# Patient Record
Sex: FEMALE | Race: BLACK OR AFRICAN AMERICAN | NOT HISPANIC OR LATINO | Employment: STUDENT | URBAN - METROPOLITAN AREA
[De-identification: names, ages, dates, MRNs, and addresses within clinical notes are randomized per-mention and may not be internally consistent; named-entity substitution may affect disease eponyms.]

---
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
## 2025-07-03 ENCOUNTER — HOSPITAL ENCOUNTER (EMERGENCY)
Facility: CLINIC | Age: 20
Discharge: HOME | End: 2025-07-03
Attending: STUDENT IN AN ORGANIZED HEALTH CARE EDUCATION/TRAINING PROGRAM
Payer: COMMERCIAL

## 2025-07-03 ENCOUNTER — APPOINTMENT (OUTPATIENT)
Dept: CT IMAGING | Facility: CLINIC | Age: 20
End: 2025-07-03
Attending: STUDENT IN AN ORGANIZED HEALTH CARE EDUCATION/TRAINING PROGRAM
Payer: COMMERCIAL

## 2025-07-03 VITALS
SYSTOLIC BLOOD PRESSURE: 171 MMHG | HEART RATE: 90 BPM | HEIGHT: 67 IN | WEIGHT: 293 LBS | BODY MASS INDEX: 45.99 KG/M2 | OXYGEN SATURATION: 100 % | DIASTOLIC BLOOD PRESSURE: 99 MMHG | RESPIRATION RATE: 17 BRPM | TEMPERATURE: 97.4 F

## 2025-07-03 DIAGNOSIS — K44.9 HIATAL HERNIA: ICD-10-CM

## 2025-07-03 DIAGNOSIS — R11.2 NAUSEA AND VOMITING, UNSPECIFIED VOMITING TYPE: Primary | ICD-10-CM

## 2025-07-03 DIAGNOSIS — R10.13 EPIGASTRIC PAIN: ICD-10-CM

## 2025-07-03 PROBLEM — Z86.59 HISTORY OF EATING DISORDER: Status: ACTIVE | Noted: 2023-05-25

## 2025-07-03 PROBLEM — F31.32 BIPOLAR AFFECTIVE DISORDER, CURRENTLY DEPRESSED, MODERATE (H): Status: ACTIVE | Noted: 2023-02-27

## 2025-07-03 PROBLEM — K21.9 GASTROESOPHAGEAL REFLUX DISEASE: Status: ACTIVE | Noted: 2023-05-25

## 2025-07-03 PROBLEM — F15.93: Status: ACTIVE | Noted: 2023-10-05

## 2025-07-03 PROBLEM — F19.20 POLYSUBSTANCE (EXCLUDING OPIOIDS) DEPENDENCE (H): Status: ACTIVE | Noted: 2023-10-05

## 2025-07-03 PROBLEM — E66.9 OBESITY: Status: ACTIVE | Noted: 2017-08-11

## 2025-07-03 PROBLEM — N92.0 MENORRHAGIA WITH REGULAR CYCLE: Status: ACTIVE | Noted: 2023-04-20

## 2025-07-03 PROBLEM — I10 HYPERTENSION: Status: ACTIVE | Noted: 2023-03-01

## 2025-07-03 PROBLEM — N92.6 IRREGULAR PERIODS/MENSTRUAL CYCLES: Status: ACTIVE | Noted: 2023-05-25

## 2025-07-03 PROBLEM — F11.13: Status: ACTIVE | Noted: 2023-02-27

## 2025-07-03 PROBLEM — F11.23 OPIOID DEPENDENCE WITH WITHDRAWAL (H): Status: ACTIVE | Noted: 2023-10-05

## 2025-07-03 PROBLEM — F15.10 METHAMPHETAMINE USE (H): Status: ACTIVE | Noted: 2023-10-03

## 2025-07-03 LAB
ALBUMIN SERPL BCG-MCNC: 5 G/DL (ref 3.5–5.2)
ALP SERPL-CCNC: 121 U/L (ref 40–150)
ALT SERPL W P-5'-P-CCNC: 28 U/L (ref 0–50)
ANION GAP SERPL CALCULATED.3IONS-SCNC: 14 MMOL/L (ref 7–15)
AST SERPL W P-5'-P-CCNC: 45 U/L (ref 0–45)
ATRIAL RATE - MUSE: 87 BPM
BASOPHILS # BLD AUTO: 0 10E3/UL (ref 0–0.2)
BASOPHILS NFR BLD AUTO: 0 %
BILIRUB SERPL-MCNC: 0.5 MG/DL
BUN SERPL-MCNC: 13.1 MG/DL (ref 6–20)
CALCIUM SERPL-MCNC: 10 MG/DL (ref 8.8–10.4)
CHLORIDE SERPL-SCNC: 100 MMOL/L (ref 98–107)
CREAT SERPL-MCNC: 0.88 MG/DL (ref 0.51–0.95)
DIASTOLIC BLOOD PRESSURE - MUSE: NORMAL MMHG
EGFRCR SERPLBLD CKD-EPI 2021: >90 ML/MIN/1.73M2
EOSINOPHIL # BLD AUTO: 0 10E3/UL (ref 0–0.7)
EOSINOPHIL NFR BLD AUTO: 0 %
ERYTHROCYTE [DISTWIDTH] IN BLOOD BY AUTOMATED COUNT: 13.7 % (ref 10–15)
GLUCOSE SERPL-MCNC: 114 MG/DL (ref 70–99)
HCG SERPL QL: NEGATIVE
HCO3 SERPL-SCNC: 22 MMOL/L (ref 22–29)
HCT VFR BLD AUTO: 39.9 % (ref 35–47)
HGB BLD-MCNC: 13.2 G/DL (ref 11.7–15.7)
IMM GRANULOCYTES # BLD: 0.1 10E3/UL
IMM GRANULOCYTES NFR BLD: 1 %
INTERPRETATION ECG - MUSE: NORMAL
LIPASE SERPL-CCNC: 24 U/L (ref 13–60)
LYMPHOCYTES # BLD AUTO: 1 10E3/UL (ref 0.8–5.3)
LYMPHOCYTES NFR BLD AUTO: 8 %
MCH RBC QN AUTO: 28.1 PG (ref 26.5–33)
MCHC RBC AUTO-ENTMCNC: 33.1 G/DL (ref 31.5–36.5)
MCV RBC AUTO: 85 FL (ref 78–100)
MONOCYTES # BLD AUTO: 0.4 10E3/UL (ref 0–1.3)
MONOCYTES NFR BLD AUTO: 3 %
NEUTROPHILS # BLD AUTO: 12.2 10E3/UL (ref 1.6–8.3)
NEUTROPHILS NFR BLD AUTO: 89 %
NRBC # BLD AUTO: 0 10E3/UL
NRBC BLD AUTO-RTO: 0 /100
P AXIS - MUSE: 27 DEGREES
PLATELET # BLD AUTO: 355 10E3/UL (ref 150–450)
POTASSIUM SERPL-SCNC: 4.4 MMOL/L (ref 3.4–5.3)
PR INTERVAL - MUSE: 162 MS
PROT SERPL-MCNC: 9.3 G/DL (ref 6.4–8.3)
QRS DURATION - MUSE: 98 MS
QT - MUSE: 386 MS
QTC - MUSE: 464 MS
R AXIS - MUSE: 21 DEGREES
RBC # BLD AUTO: 4.69 10E6/UL (ref 3.8–5.2)
SODIUM SERPL-SCNC: 136 MMOL/L (ref 135–145)
SYSTOLIC BLOOD PRESSURE - MUSE: NORMAL MMHG
T AXIS - MUSE: 11 DEGREES
TROPONIN T SERPL HS-MCNC: 7 NG/L
VENTRICULAR RATE- MUSE: 87 BPM
WBC # BLD AUTO: 13.8 10E3/UL (ref 4–11)

## 2025-07-03 PROCEDURE — 250N000011 HC RX IP 250 OP 636: Performed by: STUDENT IN AN ORGANIZED HEALTH CARE EDUCATION/TRAINING PROGRAM

## 2025-07-03 PROCEDURE — 80053 COMPREHEN METABOLIC PANEL: CPT | Performed by: STUDENT IN AN ORGANIZED HEALTH CARE EDUCATION/TRAINING PROGRAM

## 2025-07-03 PROCEDURE — 250N000009 HC RX 250: Performed by: STUDENT IN AN ORGANIZED HEALTH CARE EDUCATION/TRAINING PROGRAM

## 2025-07-03 PROCEDURE — 84484 ASSAY OF TROPONIN QUANT: CPT | Performed by: STUDENT IN AN ORGANIZED HEALTH CARE EDUCATION/TRAINING PROGRAM

## 2025-07-03 PROCEDURE — 96375 TX/PRO/DX INJ NEW DRUG ADDON: CPT

## 2025-07-03 PROCEDURE — 99285 EMERGENCY DEPT VISIT HI MDM: CPT | Mod: 25

## 2025-07-03 PROCEDURE — 36415 COLL VENOUS BLD VENIPUNCTURE: CPT | Performed by: STUDENT IN AN ORGANIZED HEALTH CARE EDUCATION/TRAINING PROGRAM

## 2025-07-03 PROCEDURE — 93005 ELECTROCARDIOGRAM TRACING: CPT

## 2025-07-03 PROCEDURE — 74177 CT ABD & PELVIS W/CONTRAST: CPT

## 2025-07-03 PROCEDURE — 84703 CHORIONIC GONADOTROPIN ASSAY: CPT | Performed by: STUDENT IN AN ORGANIZED HEALTH CARE EDUCATION/TRAINING PROGRAM

## 2025-07-03 PROCEDURE — 85025 COMPLETE CBC W/AUTO DIFF WBC: CPT | Performed by: STUDENT IN AN ORGANIZED HEALTH CARE EDUCATION/TRAINING PROGRAM

## 2025-07-03 PROCEDURE — 83690 ASSAY OF LIPASE: CPT | Performed by: STUDENT IN AN ORGANIZED HEALTH CARE EDUCATION/TRAINING PROGRAM

## 2025-07-03 PROCEDURE — 96374 THER/PROPH/DIAG INJ IV PUSH: CPT | Mod: 59

## 2025-07-03 PROCEDURE — 96376 TX/PRO/DX INJ SAME DRUG ADON: CPT

## 2025-07-03 PROCEDURE — 258N000003 HC RX IP 258 OP 636: Performed by: STUDENT IN AN ORGANIZED HEALTH CARE EDUCATION/TRAINING PROGRAM

## 2025-07-03 PROCEDURE — 96361 HYDRATE IV INFUSION ADD-ON: CPT

## 2025-07-03 RX ORDER — ONDANSETRON 4 MG/1
4 TABLET, ORALLY DISINTEGRATING ORAL EVERY 8 HOURS PRN
Qty: 10 TABLET | Refills: 0 | Status: SHIPPED | OUTPATIENT
Start: 2025-07-03 | End: 2025-07-03

## 2025-07-03 RX ORDER — DROPERIDOL 2.5 MG/ML
1.25 INJECTION, SOLUTION INTRAMUSCULAR; INTRAVENOUS ONCE
Status: COMPLETED | OUTPATIENT
Start: 2025-07-03 | End: 2025-07-03

## 2025-07-03 RX ORDER — ONDANSETRON 4 MG/1
4 TABLET, ORALLY DISINTEGRATING ORAL EVERY 8 HOURS PRN
Qty: 10 TABLET | Refills: 0 | Status: SHIPPED | OUTPATIENT
Start: 2025-07-03 | End: 2025-07-06

## 2025-07-03 RX ORDER — IOPAMIDOL 755 MG/ML
120 INJECTION, SOLUTION INTRAVASCULAR ONCE
Status: COMPLETED | OUTPATIENT
Start: 2025-07-03 | End: 2025-07-03

## 2025-07-03 RX ORDER — ONDANSETRON 2 MG/ML
4 INJECTION INTRAMUSCULAR; INTRAVENOUS EVERY 30 MIN PRN
Status: DISPENSED | OUTPATIENT
Start: 2025-07-03

## 2025-07-03 RX ADMIN — IOPAMIDOL 100 ML: 755 INJECTION, SOLUTION INTRAVENOUS at 22:19

## 2025-07-03 RX ADMIN — PROCHLORPERAZINE EDISYLATE 10 MG: 5 INJECTION INTRAMUSCULAR; INTRAVENOUS at 19:23

## 2025-07-03 RX ADMIN — SODIUM CHLORIDE, SODIUM LACTATE, POTASSIUM CHLORIDE, AND CALCIUM CHLORIDE 1000 ML: .6; .31; .03; .02 INJECTION, SOLUTION INTRAVENOUS at 21:09

## 2025-07-03 RX ADMIN — ONDANSETRON 4 MG: 2 INJECTION, SOLUTION INTRAMUSCULAR; INTRAVENOUS at 21:08

## 2025-07-03 RX ADMIN — ONDANSETRON 4 MG: 2 INJECTION, SOLUTION INTRAMUSCULAR; INTRAVENOUS at 18:52

## 2025-07-03 RX ADMIN — SODIUM CHLORIDE 65 ML: 9 INJECTION, SOLUTION INTRAVENOUS at 22:20

## 2025-07-03 RX ADMIN — DROPERIDOL 1.25 MG: 2.5 INJECTION, SOLUTION INTRAMUSCULAR; INTRAVENOUS at 22:10

## 2025-07-03 RX ADMIN — SODIUM CHLORIDE, SODIUM LACTATE, POTASSIUM CHLORIDE, AND CALCIUM CHLORIDE 1000 ML: .6; .31; .03; .02 INJECTION, SOLUTION INTRAVENOUS at 18:49

## 2025-07-03 ASSESSMENT — ACTIVITIES OF DAILY LIVING (ADL)
ADLS_ACUITY_SCORE: 42

## 2025-07-03 ASSESSMENT — COLUMBIA-SUICIDE SEVERITY RATING SCALE - C-SSRS
6. HAVE YOU EVER DONE ANYTHING, STARTED TO DO ANYTHING, OR PREPARED TO DO ANYTHING TO END YOUR LIFE?: NO
2. HAVE YOU ACTUALLY HAD ANY THOUGHTS OF KILLING YOURSELF IN THE PAST MONTH?: NO
1. IN THE PAST MONTH, HAVE YOU WISHED YOU WERE DEAD OR WISHED YOU COULD GO TO SLEEP AND NOT WAKE UP?: NO

## 2025-07-03 NOTE — ED PROVIDER NOTES
Emergency Department Note      History of Present Illness     Chief Complaint   Nausea, Vomiting, & Diarrhea      HPI   Raquel Parker is a very pleasant 20 year old female with a history of hypertension presenting with nausea, vomiting and diarrhea. Patient reports that she began vomiting at 0700 this morning shortly after she woke up. She hasn't been able to keep food down all day, and believes she has had over twenty episodes of emesis. This has happened a few times in the past, and she has needed IV fluids to rehydrate. She is also complaining of epigastric abdominal pain both while vomiting and while not. She had a single formed bowel movement today, and does not believe she is pregnant. She is sexually active but uses a morning after pill. She experienced shortness of breath this morning while laying in bed, and has experienced this intermittently throughout the day. She had four drinks of tequila last night. She has not used cannabis in 90 days. She endorses some chills while vomiting. She denies diarrhea, hematochezia, melena, difficulty urinating, fever, dysuria, history, or chest pain.    Independent Historian   None    Review of External Notes   I personally reviewed notes from the patient's emergency department visit  dated 5/6/25. This provided me with information regarding patient's baseline medical problems.     Past Medical History     Medical History and Problem List   Cannabis abuse  Fentanyl  Depression  Anxiety  Fentanyl use disorder  Bipolar affective disordedr  Methamphetamine use  Hypertension  Opioid dependence with withdrawal   Obesity     Medications   Buspirone  Cozaar  Minipress  Suboxone  Zoloft  Seroquel     Surgical History   Internal fixation tibia child     Physical Exam     Patient Vitals for the past 24 hrs:   BP Temp Temp src Pulse Resp SpO2 Height Weight   07/03/25 2303 -- -- -- 90 -- 100 % -- --   07/03/25 2302 -- -- -- 87 -- 100 % -- --   07/03/25 2301 -- -- -- 88 -- 100 %  "-- --   07/03/25 2300 -- -- -- 103 -- 100 % -- --   07/03/25 2259 -- -- -- 87 -- 100 % -- --   07/03/25 2258 -- -- -- 96 -- 100 % -- --   07/03/25 2257 -- -- -- 84 -- 100 % -- --   07/03/25 2256 -- -- -- 87 -- 100 % -- --   07/03/25 2213 -- -- -- 116 -- 100 % -- --   07/03/25 2212 -- -- -- 105 -- 100 % -- --   07/03/25 2211 -- -- -- 92 -- 100 % -- --   07/03/25 2210 (!) 171/99 -- -- 92 -- 100 % -- --   07/03/25 2029 -- -- -- -- -- 100 % -- --   07/03/25 2028 -- -- -- -- -- 100 % -- --   07/03/25 2027 -- -- -- -- -- 100 % -- --   07/03/25 2026 -- -- -- -- -- 100 % -- --   07/03/25 2025 -- -- -- -- -- 100 % -- --   07/03/25 2024 -- -- -- -- -- 100 % -- --   07/03/25 2023 -- -- -- -- -- 100 % -- --   07/03/25 2022 -- -- -- -- -- 100 % -- --   07/03/25 2016 -- -- -- -- -- 100 % -- --   07/03/25 2015 -- -- -- -- -- 100 % -- --   07/03/25 2014 -- -- -- -- -- 100 % -- --   07/03/25 2013 -- -- -- -- -- 100 % -- --   07/03/25 2012 -- -- -- -- -- 100 % -- --   07/03/25 2011 -- -- -- -- -- 100 % -- --   07/03/25 2010 -- -- -- -- -- 100 % -- --   07/03/25 2009 -- -- -- -- -- 100 % -- --   07/03/25 1941 -- -- -- -- -- 100 % -- --   07/03/25 1940 -- -- -- -- -- 100 % -- --   07/03/25 1939 -- -- -- -- -- 100 % -- --   07/03/25 1938 (!) 187/111 -- -- 91 -- 100 % -- --   07/03/25 1807 (!) 181/118 97.4  F (36.3  C) Temporal 81 17 100 % 1.702 m (5' 7\") (!) 143.3 kg (315 lb 14.7 oz)     Physical Exam  {List of [place] Exams:[id number]::\" \"}    Diagnostics     Lab Results   Labs Ordered and Resulted from Time of ED Arrival to Time of ED Departure   COMPREHENSIVE METABOLIC PANEL - Abnormal       Result Value    Sodium 136      Potassium 4.4      Carbon Dioxide (CO2) 22      Anion Gap 14      Urea Nitrogen 13.1      Creatinine 0.88      GFR Estimate >90      Calcium 10.0      Chloride 100      Glucose 114 (*)     Alkaline Phosphatase 121      AST 45      ALT 28      Protein Total 9.3 (*)     Albumin 5.0      Bilirubin Total 0.5     CBC WITH " PLATELETS AND DIFFERENTIAL - Abnormal    WBC Count 13.8 (*)     RBC Count 4.69      Hemoglobin 13.2      Hematocrit 39.9      MCV 85      MCH 28.1      MCHC 33.1      RDW 13.7      Platelet Count 355      % Neutrophils 89      % Lymphocytes 8      % Monocytes 3      % Eosinophils 0      % Basophils 0      % Immature Granulocytes 1      NRBCs per 100 WBC 0      Absolute Neutrophils 12.2 (*)     Absolute Lymphocytes 1.0      Absolute Monocytes 0.4      Absolute Eosinophils 0.0      Absolute Basophils 0.0      Absolute Immature Granulocytes 0.1      Absolute NRBCs 0.0     HCG QUALITATIVE PREGNANCY - Normal    hCG Serum Qualitative Negative     LIPASE - Normal    Lipase 24     TROPONIN T, HIGH SENSITIVITY - Normal    Troponin T, High Sensitivity 7         Imaging   CT Abdomen Pelvis w Contrast   Final Result   IMPRESSION:   1.  No evidence of acute pathology in the abdomen and pelvis.   2.  Small hiatal hernia.   3.  3.6 cm cystic area in the left renal region below the level of the symphysis pubis, decreased in size as compared to 3/17/2024 exam, could represent Bartholin gland cyst.             EKG   ECG taken at 1855, ECG read at 1855  Normal sinus rhythm with sinus arrhythmia    Rate 87 bpm. IA interval 162 ms. QRS duration 98 ms. QT/QTc 386/464 ms. P-R-T axes 27 21 11.     Independent Interpretation   None    ED Course      Medications Administered   Medications   ondansetron (ZOFRAN) injection 4 mg (4 mg Intravenous $Given 7/3/25 2108)   lactated ringers BOLUS 1,000 mL (0 mLs Intravenous Stopped 7/3/25 2135)   prochlorperazine (COMPAZINE) injection 10 mg (10 mg Intravenous $Given 7/3/25 1923)   lactated ringers BOLUS 1,000 mL (0 mLs Intravenous Stopped 7/3/25 2322)   droPERidol (INAPSINE) injection 1.25 mg (1.25 mg Intravenous $Given 7/3/25 2210)   iopamidol (ISOVUE-370) solution 120 mL (100 mLs Intravenous $Given 7/3/25 2219)   sodium chloride 0.9 % bag for CT scan flush (65 mLs Intravenous $Given 7/3/25 2220)        Procedures   Procedures   None performed    Discussion of Management   None    ED Course   ED Course as of 07/03/25 2326   u Jul 03, 2025 1814 I obtained the history and performed the examination as described.    2029 I rechecked and updated the patient.     2318 I rechecked and updated the patient.   Patient is feeling much better.  Discussed discharge.       Additional Documentation  None    Medical Decision Making / Diagnosis     CMS Diagnoses: None    MIPS   None    [facility]   Raquel Parker is a 20 year old female ***    {[facility] Options   :137580}         Disposition   The patient was discharged.     Diagnosis     ICD-10-CM    1. Nausea and vomiting, unspecified vomiting type  R11.2       2. Epigastric pain  R10.13       3. Hiatal hernia  K44.9            Discharge Medications   New Prescriptions    ONDANSETRON (ZOFRAN ODT) 4 MG ODT TAB    Take 1 tablet (4 mg) by mouth every 8 hours as needed for nausea.         Scribe Disclosure:  I, Damon Cobos, am serving as a scribe at 6:24 PM on 7/3/2025 to document services personally performed by Earle Heath MD based on my observations and the provider's statements to me.      patient's symptoms.  She will be discharged with Zofran.  Recommended follow-up with her primary care clinician in 4 days after the holiday weekend if she is having persistent symptoms.  Also recommended return to the emergency department should she develop other concerning symptoms or have nausea and vomiting that is refractory to treatment with Zofran at home.         Disposition   The patient was discharged.     Diagnosis     ICD-10-CM    1. Nausea and vomiting, unspecified vomiting type  R11.2       2. Epigastric pain  R10.13       3. Hiatal hernia  K44.9            Discharge Medications   Discharge Medication List as of 7/3/2025 11:22 PM            Scribe Disclosure:  I, Damon Cobos, am serving as a scribe at 6:24 PM on 7/3/2025 to document services personally performed by Earle Heath MD based on my observations and the provider's statements to me.        Earle Heath MD  07/04/25 0147

## 2025-07-03 NOTE — ED NOTES
Pt somnolent in room, including falling asleep during PIV insertion. Pt responds to voice. Rude and oriented when awoken. Dr. Heath updated.

## 2025-07-03 NOTE — ED TRIAGE NOTES
Pt arrives from [facility] stating that she has been throwing up since 0700 she cannot keep anything down.  She states she has abdominal discomfort from the vomiting      Triage Assessment (Adult)       Row Name 07/03/25 9302          Triage Assessment    Airway WDL WDL        Respiratory WDL    Respiratory WDL WDL        Skin Circulation/Temperature WDL    Skin Circulation/Temperature WDL WDL        Cardiac WDL    Cardiac WDL WDL        Peripheral/Neurovascular WDL    Peripheral Neurovascular WDL WDL        Cognitive/Neuro/Behavioral WDL    Cognitive/Neuro/Behavioral WDL WDL

## 2025-07-04 NOTE — ED NOTES
Pt. requested prescriptions be transferred to 24 hour [facility] in [facility]. ED MD Heath notified and changing pharmacy. Pt. Did not want new paperwork and would be headed to the pharmacy after discharge. Verbalized understanding.

## 2025-07-04 NOTE — DISCHARGE INSTRUCTIONS
Thank you for allowing us to evaluate you today.  Follow up with primary care clinician  in 3-5 days as needed for reevaluation..    Take the ondansetron as prescribed for nausea.   Please read the guidance provided with your discharge instructions.  Immediately return to the emergency department with any concerns.

## 2025-07-07 LAB
ATRIAL RATE - MUSE: 87 BPM
DIASTOLIC BLOOD PRESSURE - MUSE: NORMAL MMHG
INTERPRETATION ECG - MUSE: NORMAL
P AXIS - MUSE: 27 DEGREES
PR INTERVAL - MUSE: 162 MS
QRS DURATION - MUSE: 98 MS
QT - MUSE: 386 MS
QTC - MUSE: 464 MS
R AXIS - MUSE: 21 DEGREES
SYSTOLIC BLOOD PRESSURE - MUSE: NORMAL MMHG
T AXIS - MUSE: 11 DEGREES
VENTRICULAR RATE- MUSE: 87 BPM

## (undated) DEVICE — DRSG ADAPTIC 3X8" 6113

## (undated) DEVICE — Device

## (undated) DEVICE — CAST PADDING 4" UNSTERILE 9044

## (undated) DEVICE — LINEN GOWN X4 5410

## (undated) DEVICE — SU VICRYL 1 CTX 36" J371H

## (undated) DEVICE — STRAP KNEE/BODY 31143004

## (undated) DEVICE — SU VICRYL 0 CT-1 3X27" J430T

## (undated) DEVICE — LIGHT HANDLE X2

## (undated) DEVICE — SU VICRYL 2-0 CT-1 27" UND J259H

## (undated) DEVICE — CAST PADDING 6" STERILE 9046S

## (undated) DEVICE — LINEN TOWEL PACK X5 5464

## (undated) DEVICE — SU MONOCRYL 4-0 PS-2 18" UND Y496G

## (undated) DEVICE — LINEN BACK PACK 5440

## (undated) DEVICE — DRAPE C-ARM W/STRAPS 42X72" 07-CA104

## (undated) DEVICE — DRSG STERI STRIP 1/2X4" R1547

## (undated) DEVICE — CAST PADDING 6" UNSTERILE 9046

## (undated) DEVICE — PREP CHLORAPREP 26ML TINTED ORANGE  260815

## (undated) DEVICE — DRILL BIT QUICK COUPLING CANN 2.7MM 310.67

## (undated) DEVICE — DRSG AQUACEL AG 3.5X6.0" HYDROFIBER 412010

## (undated) DEVICE — GOWN XLG DISP 9545

## (undated) DEVICE — GUIDE WIRE 1.25X150MM 4.0MM THRD 900.722

## (undated) DEVICE — DRAPE STERI TOWEL LG 1010

## (undated) DEVICE — ESU GROUND PAD UNIVERSAL W/O CORD

## (undated) DEVICE — BASIN SET MAJOR

## (undated) DEVICE — SU MONOCRYL 3-0 PS-2 18" UND Y497G

## (undated) RX ORDER — CEFAZOLIN SODIUM 2 G/100ML
INJECTION, SOLUTION INTRAVENOUS
Status: DISPENSED
Start: 2017-01-05

## (undated) RX ORDER — FENTANYL CITRATE 50 UG/ML
INJECTION, SOLUTION INTRAMUSCULAR; INTRAVENOUS
Status: DISPENSED
Start: 2017-01-05